# Patient Record
Sex: FEMALE | Race: WHITE | NOT HISPANIC OR LATINO | URBAN - NONMETROPOLITAN AREA
[De-identification: names, ages, dates, MRNs, and addresses within clinical notes are randomized per-mention and may not be internally consistent; named-entity substitution may affect disease eponyms.]

---

## 2020-09-28 ENCOUNTER — IMPORTED ENCOUNTER (OUTPATIENT)
Dept: URBAN - NONMETROPOLITAN AREA CLINIC 1 | Facility: CLINIC | Age: 70
End: 2020-09-28

## 2020-09-28 PROBLEM — H40.1131: Noted: 2018-05-03

## 2020-09-28 PROBLEM — H25.813: Noted: 2020-09-28

## 2020-09-28 PROCEDURE — 92133 CPTRZD OPH DX IMG PST SGM ON: CPT

## 2020-09-28 PROCEDURE — 92014 COMPRE OPH EXAM EST PT 1/>: CPT

## 2020-09-28 NOTE — PATIENT DISCUSSION
COAGAppears stable. IOP 19 OU 09/28/20continue Latanoprost QHS OU Stressed importance of compliance Continue to monitorCataract(s)Visually significant. Cataract(s) causing symptomatic impairment of visual function not correctable with a tolerable change in glasses or contact lenses lighting or non-operative means resulting in specific activity limitations and/or participation restrictions including but not limited to reading viewing television driving or meeting vocational or recreational needs. Expectation is clearer vision and reduced glare disability after cataract removal.-Refer to Dr Lezlie Siemens for cataract evaluation

## 2022-04-10 ASSESSMENT — VISUAL ACUITY
OD_CC: 20/200
OS_CC: 20/80

## 2022-04-10 ASSESSMENT — TONOMETRY
OD_IOP_MMHG: 19
OS_IOP_MMHG: 19